# Patient Record
(demographics unavailable — no encounter records)

---

## 2024-10-11 NOTE — REASON FOR VISIT
[TextEntry] : 28-year-old male with history of right UVJ stone  Patient developed right flank pain and lots 1 week ago.  He also had gross hematuria.  He went to an urgent care where his urine was negative for infection.  He went to [facility] ER on 10/5 which showed normal renal bladder ultrasound.  Urinalysis showed blood.  His symptoms persisted and he returned to the [facility] ER on 10/10 which then showed mild hydronephrosis to the level of a 3 mm right UVJ stone.  Creatinine 1.16.  Patient reports that his pain subsided yesterday.  He has not seen the stone pass.  This is for stone episode.  He denies any prior urologic history.  He denies any urinary complaints.  He denies prior episodes of gross hematuria.  He does have a smoking history of 10 pack years.  He denies a family history of kidney stones He denies a family history of prostate cancer  His only medical issue is psoriasis.

## 2024-10-11 NOTE — ASSESSMENT
[FreeTextEntry1] : 28-year-old male with history of 3 mm right UVJ stone  Patient had hematuria during episode.  He has not caught the stone but no longer has pain.  Longer has LUTS either.  Advised that we will send his urine today for urinalysis and urine culture.  If he has persistent blood, we can repeat the urine study in a few weeks.  If it is negative for blood, this is reassuring and no further workup is indicated.  Regarding his stone, we do not have a stone specimen.  Advised that if he sees this pass, he should collect it so we can send for stone analysis.  Counseled about stone prevention including 2.5 L of water, low-salt diet, and fruits and vegetables/citrate.  Will call the patient with the results of his urine studies

## 2024-10-11 NOTE — REASON FOR VISIT
[TextEntry] : 28-year-old male with history of right UVJ stone  Patient developed right flank pain and lots 1 week ago.  He also had gross hematuria.  He went to an urgent care where his urine was negative for infection.  He went to [facility] ER on 10/5 which showed normal renal bladder ultrasound.  Urinalysis showed blood.  His symptoms persisted and he returned to the [facility] ER on 10/10 which then showed mild hydronephrosis to the level of a 3 mm right UVJ stone.  Creatinine 1.16.  Patient reports that his pain subsided yesterday.  He has not seen the stone pass.  This is for stone episode.  He denies any prior urologic history.  He denies any urinary complaints.  He denies prior episodes of gross hematuria.  He does have a smoking history of 10 pack years.  He denies a family history of kidney stones He denies a family history of prostate cancer  His only medical issue is psoriasis.